# Patient Record
Sex: FEMALE | Race: BLACK OR AFRICAN AMERICAN | NOT HISPANIC OR LATINO | Employment: FULL TIME | URBAN - METROPOLITAN AREA
[De-identification: names, ages, dates, MRNs, and addresses within clinical notes are randomized per-mention and may not be internally consistent; named-entity substitution may affect disease eponyms.]

---

## 2021-11-15 ENCOUNTER — HISTORICAL (OUTPATIENT)
Dept: ADMINISTRATIVE | Facility: HOSPITAL | Age: 26
End: 2021-11-15

## 2021-12-14 ENCOUNTER — HISTORICAL (OUTPATIENT)
Dept: ADMINISTRATIVE | Facility: HOSPITAL | Age: 26
End: 2021-12-14

## 2021-12-14 LAB
ABS NEUT (OLG): 2.8 X10(3)/MCL (ref 2.1–9.2)
ERYTHROCYTE [DISTWIDTH] IN BLOOD BY AUTOMATED COUNT: 16.1 % (ref 11.5–17)
FERRITIN SERPL-MCNC: 20.2 NG/ML (ref 4.63–204)
HCT VFR BLD AUTO: 36.9 % (ref 37–47)
HGB BLD-MCNC: 10.8 GM/DL (ref 12–16)
IRON SATN MFR SERPL: 17 % (ref 20–50)
IRON SERPL-MCNC: 48 UG/DL (ref 50–170)
LYMPHOCYTES # BLD AUTO: 2.8 X10(3)/MCL (ref 0.6–3.4)
LYMPHOCYTES NFR BLD AUTO: 46.5 % (ref 13–40)
MCH RBC QN AUTO: 22.3 PG (ref 27–31.2)
MCHC RBC AUTO-ENTMCNC: 29 GM/DL (ref 32–36)
MCV RBC AUTO: 76 FL (ref 80–94)
MONOCYTES # BLD AUTO: 0.4 X10(3)/MCL (ref 0.1–1.3)
MONOCYTES NFR BLD AUTO: 6.8 % (ref 0.1–24)
NEUTROPHILS NFR BLD AUTO: 46.7 % (ref 47–80)
PLATELET # BLD AUTO: 449 X10(3)/MCL (ref 130–400)
PMV BLD AUTO: 8.2 FL (ref 9.4–12.4)
RBC # BLD AUTO: 4.85 X10(6)/MCL (ref 4.2–5.4)
TIBC SERPL-MCNC: 231 UG/DL (ref 70–310)
TIBC SERPL-MCNC: 279 UG/DL (ref 250–450)
TRANSFERRIN SERPL-MCNC: 259 MG/DL (ref 180–382)
WBC # SPEC AUTO: 6 X10(3)/MCL (ref 4.5–11.5)

## 2022-01-21 LAB
C TRACH DNA SPEC QL NAA+PROBE: NEGATIVE
PAP RECOMMENDATION EXT: NORMAL
PAP SMEAR: NORMAL

## 2022-03-17 ENCOUNTER — HISTORICAL (OUTPATIENT)
Dept: ADMINISTRATIVE | Facility: HOSPITAL | Age: 27
End: 2022-03-17

## 2022-03-17 LAB
FERRITIN SERPL-MCNC: 11.6 NG/ML (ref 4.63–204)
IRON SATN MFR SERPL: 40 % (ref 20–50)
IRON SERPL-MCNC: 123 UG/DL (ref 50–170)
TIBC SERPL-MCNC: 181 UG/DL (ref 70–310)
TIBC SERPL-MCNC: 304 UG/DL (ref 250–450)
TRANSFERRIN SERPL-MCNC: 273 MG/DL (ref 180–382)

## 2022-04-07 ENCOUNTER — HISTORICAL (OUTPATIENT)
Dept: ADMINISTRATIVE | Facility: HOSPITAL | Age: 27
End: 2022-04-07

## 2022-04-23 VITALS
WEIGHT: 125.44 LBS | SYSTOLIC BLOOD PRESSURE: 126 MMHG | HEIGHT: 59 IN | DIASTOLIC BLOOD PRESSURE: 68 MMHG | BODY MASS INDEX: 25.29 KG/M2

## 2022-05-02 NOTE — HISTORICAL [facility] CERNER
This is a historical note converted from Maci. Formatting and pictures may have been removed.  Please reference Maci for original formatting and attached multimedia. Chief Complaint  1 mth r/c  History of Present Illness  Patient is a 26-year-old female who presents today for follow-up?on ongoing anxiety and depression along with abdominal discomfort.? She has been having some abdominal discomfort?since she started Effexor, at her last visit we discontinued Effexor and started Lexapro 10 mg.? 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 Therefore she did not go for screening at this time.? Today she states her abdominal symptoms have completely resolved?since she had discontinued the Effexor, she is not having any concerns?at this time?and does not feel the need to pursue further evaluation with a HIDA scan at this time.? Overall she states that the Lexapro is working great, she is not having any issues with breakthrough anxiety, depression is currently controlled, no S/H ideation.? She is still using her hydroxyzine as needed to help with insomnia at times?however is not taking daily.? She is continuing to take the omeprazole, she will complete her prescription as directed?and continue diet modifications as she has made adjustments since initial evaluation.? She is also increased her H2O intake.? Overall she feels?that her symptoms have resolved and wishes?to continue current medication regimen at this time without adjustments and defers?further evaluation?with HIDA scan at this time?due to?symptoms resolving  She is also due for repeat CBC, she was advised to initiate iron OTC as directed and has done so for the past month.? She denies any fatigue overall, she denies heavy menses  Review of Systems  Constitutional:?no weight gain,?no weight loss,?no fatigue,?no fever,?no chills,?no  weakness,?no trouble sleeping.  Throat:?no bleeding,?no dentures,?no sore tongue,?no dry mouth,?no sore throat,?no hoarseness,?no thrush,?no non-healing sores.  Cardiovascular:?no chest pain or discomfort,?no tightness,?no palpitations,?no SOB with activity,?no difficulty breathing while supine,?no swelling,?no sudden awakening from sleep with SOB.  Respiratory:??no cough,?no sputum,?no coughing up blood,?no SOB,?no wheezing,?no painful breathing.  Gastrointestinal:?no swallowing difficulty,?no heartburn,?no change in appetite,?no nausea,?no change in bowel habits,?no rectal bleeding,?no constipation,?no diarrhea,?no yellow eyes or skin.  Psychiatric:?no nervousness,?no stress,?no depression,?no memory loss.- currently controlled with meds, no s/h ideation  Physical Exam  Vitals & Measurements  BP:?126/68?  HT:?151.00?cm? WT:?56.900?kg? BMI:?24.96?  General- In NAD, A&O x 4  ?   Respiratory- CTA, No wheezing, No crackles, No rhonchi  ?   Cardiovascular- RRR W/O MGR, Pulses equal throughout  ?   Gastrointestinal- S, NT, No HSM, NABS, No masses, No peritoneal signs?  Assessment/Plan  1.?Anxiety and depression?F41.9  1. ?Continue Lexapro 10 mg p.o. daily as directed, currently tolerating well without any side effects  2.? Continue relaxation techniques  3.? Currently stable control, no S/H ideation, ER precautions advised for any changes or worsening symptoms  4.? She is due for her repeat CPX?in March, will follow up at this time?sooner if needed  Ordered:  Clinic Follow-up PRN, 12/14/21 15:30:00 CST, [facility] - [facility], Future Order  Office/Outpatient Visit Level 3 Established [facility] PC, Anxiety and depression  Anemia, [facility] - [facility], 12/14/21 15:30:00 CST  ?  2.?Anemia?D64.9  1. ?Repeat CBC along with iron?profile and ferritin level?as she has been taking OTC iron as previously directed for the past month  Ordered:  Clinic Follow-up PRN, 12/14/21 15:30:00 CST, [facility] - [facility], Future Order  Ferritin, Routine collect,  12/14/21 15:30:00 CST, Blood, Order for future visit, Stop date 12/14/21 15:30:00 CST, Lab Collect, Anemia, 12/14/21 15:30:00 CST  Iron Binding Capacity Total - Iron Profile, Routine collect, 12/14/21 15:30:00 CST, Blood, Order for future visit, Stop date 12/14/21 15:30:00 CST, Lab Collect, Anemia, 12/14/21 15:30:00 CST  Office/Outpatient Visit Level 3 Established [address] PC, Anxiety and depression  Anemia, [facility] - [facility], 12/14/21 15:30:00 CST  ?  3.?Pain in the abdomen?R10.9  1. ?Has completely resolved with discontinue?Effexor, she defers further evaluation with a HIDA scan at this time after her symptoms?have subsided, she will continue to monitor?and keep us posted if symptoms return  ?  Orders:  escitalopram, 10 mg = 1 tab(s), Oral, Daily, # 30 tab(s), 5 Refill(s), Pharmacy: [facility]/pharmacy #[id number], 151, cm, Height/Length Dosing, 12/14/21 15:12:00 CST, 56.9, kg, Weight Dosing, 12/14/21 15:12:00 CST  omeprazole, 40 mg = 1 cap(s), Oral, Daily, # 30 cap(s), 2 Refill(s), Pharmacy: [facility]/pharmacy #[id number], 151, cm, Height/Length Dosing, 12/14/21 15:12:00 CST, 56.9, kg, Weight Dosing, 12/14/21 15:12:00 CST  Referrals  Clinic Follow up, Order for future visit  Clinic Follow-up PRN, 12/14/21 15:30:00 CST, [facility] - [facility], Future Order   Problem List/Past Medical History  Ongoing  Migraine  Historical  No qualifying data  Medications  albuterol 90 mcg/inh inhalation aerosol, 2 puff(s), INH, q6hr, PRN, 5 refills  hydrOXYzine hydrochloride 50 mg oral tablet, See Instructions, 5 refills  Lexapro 10 mg oral tablet, 10 mg= 1 tab(s), Oral, Daily, 5 refills  omeprazole 40 mg oral DR capsule, 40 mg= 1 cap(s), Oral, Daily, 2 refills  Topamax 50 mg oral tablet, 50 mg= 1 tab(s), Oral, BID, 3 refills  Allergies  No Known Allergies  Family History  Asthma.: Mother.  Breast cancer: Grandmother.  Cardiac arrest.: Grandfather.  Hypertension.: Mother, Father, Aunt and Grandfather.  Renal failure syndrome.: Grandfather.  Stroke:  Aunt.  Immunizations  Vaccine Date Status   influenza virus vaccine, inactivated 11/01/2021 Given   COVID-19 MRNA, LNP-S, PF- Pfizer 04/01/2021 Given   COVID-19 MRNA, LNP-S, PF- Pfizer 03/11/2021 Given   influenza virus vaccine, inactivated 10/12/2020 Given   tetanus/diphtheria/pertussis, acel(Tdap) 03/05/2020 Given   influenza virus vaccine, inactivated 10/07/2016 Recorded   hepatitis B pediatric vaccine 07/30/2015 Recorded   hepatitis B adult vaccine 01/09/2015 Recorded   human papillomavirus vaccine 05/21/2013 Recorded   human papillomavirus vaccine 05/01/2012 Recorded   varicella virus vaccine 05/01/2012 Recorded   meningococcal conjugate vaccine 05/01/2012 Recorded   hepatitis A pediatric vaccine 05/01/2012 Recorded   human papillomavirus vaccine 09/02/2010 Recorded   varicella virus vaccine 09/02/2010 Recorded   tetanus/diphtheria/pertussis, acel(Tdap) 09/02/2010 Recorded   meningococcal conjugate vaccine 09/02/2010 Recorded   hepatitis A pediatric vaccine 09/02/2010 Recorded   poliovirus vaccine, live, trivalent 07/07/1999 Recorded   measles/mumps/rubella virus vaccine 07/07/1999 Recorded   haemophilus b conjugate (PRP-T) vaccine 01/08/1997 Recorded   measles/mumps/rubella virus vaccine 07/03/1996 Recorded   poliovirus vaccine, inactivated 04/03/1996 Recorded   haemophilus b conjugate (PRP-T) vaccine 04/03/1996 Recorded   poliovirus vaccine, inactivated 01/31/1996 Recorded   hepatitis B pediatric vaccine 01/31/1996 Recorded   haemophilus b conjugate (PRP-T) vaccine 01/31/1996 Recorded   poliovirus vaccine, inactivated 1995 Recorded   hepatitis B pediatric vaccine 1995 Recorded   haemophilus b conjugate (PRP-T) vaccine 1995 Recorded   hepatitis B pediatric vaccine 1995 Recorded   Health Maintenance  Health Maintenance  ???Pending?(in the next year)  ??? ??Due?  ??? ? ? ?ADL Screening due??12/14/21??and every 1??year(s)  ??? ??Due In Future?  ??? ? ? ?Obesity Screening not due  until??01/01/22??and every 1??year(s)  ??? ? ? ?Alcohol Misuse Screening not due until??01/02/22??and every 1??year(s)  ??? ? ? ?Depression Screening not due until??03/11/22??and every 1??year(s)  ??? ? ? ?TB Skin Test not due until??05/13/22??and every 1??year(s)  ???Satisfied?(in the past 1 year)  ??? ??Satisfied?  ??? ? ? ?Alcohol Misuse Screening on??03/11/21.??Satisfied by Myles Cordero MD  ??? ? ? ?Blood Pressure Screening on??12/14/21.??Satisfied by Jenifer Frye MA  ??? ? ? ?Body Mass Index Check on??12/14/21.??Satisfied by Jenifer Frye MA  ??? ? ? ?Cervical Cancer Screening on??01/11/21.??Satisfied by Shanon Zhou  ??? ? ? ?Depression Screening on??03/11/21.??Satisfied by Myles Cordero MD  ??? ? ? ?Influenza Vaccine on??11/01/21.  ??? ? ? ?Obesity Screening on??12/14/21.??Satisfied by Jenifer Frye MA  ??? ? ? ?TB Skin Test on??05/13/21.  ?      The?physician?is present within?the office with the?nurse practitioner.??The?office visit?documentation and management have been?reviewed and agreed upon.? I will continue to follow?this patient along with?the nurse practitioner?for current and future care.

## 2022-12-17 ENCOUNTER — DOCUMENTATION ONLY (OUTPATIENT)
Dept: FAMILY MEDICINE | Facility: CLINIC | Age: 27
End: 2022-12-17

## 2023-03-20 PROBLEM — J45.909 ASTHMA: Status: ACTIVE | Noted: 2023-03-20

## 2023-03-20 PROBLEM — G43.009 MIGRAINE WITHOUT AURA AND WITHOUT STATUS MIGRAINOSUS, NOT INTRACTABLE: Status: ACTIVE | Noted: 2023-03-20

## 2023-03-20 PROBLEM — G43.909 MIGRAINE: Status: RESOLVED | Noted: 2023-03-20 | Resolved: 2023-03-20

## 2023-03-20 PROBLEM — J45.21 MILD INTERMITTENT ASTHMA WITH ACUTE EXACERBATION: Status: ACTIVE | Noted: 2023-03-20

## 2023-03-20 PROBLEM — G43.909 MIGRAINE: Status: ACTIVE | Noted: 2023-03-20

## 2023-03-20 PROBLEM — G43.909 MIGRAINE HEADACHE: Status: ACTIVE | Noted: 2023-03-20

## 2023-03-20 PROCEDURE — 83550 IRON BINDING TEST: CPT | Performed by: FAMILY MEDICINE

## 2023-03-20 PROCEDURE — 82607 VITAMIN B-12: CPT | Performed by: FAMILY MEDICINE

## 2023-07-11 ENCOUNTER — PATIENT MESSAGE (OUTPATIENT)
Dept: RESEARCH | Facility: HOSPITAL | Age: 28
End: 2023-07-11
Payer: COMMERCIAL

## 2024-03-21 PROBLEM — Z82.49 FAMILY HISTORY OF ASCVD (ARTERIOSCLEROTIC CARDIOVASCULAR DISEASE): Status: ACTIVE | Noted: 2024-03-21
